# Patient Record
Sex: FEMALE | Race: WHITE
[De-identification: names, ages, dates, MRNs, and addresses within clinical notes are randomized per-mention and may not be internally consistent; named-entity substitution may affect disease eponyms.]

---

## 2019-02-15 ENCOUNTER — HOSPITAL ENCOUNTER (OUTPATIENT)
Dept: HOSPITAL 47 - RADMAMWWP | Age: 69
End: 2019-02-15
Payer: COMMERCIAL

## 2019-02-15 DIAGNOSIS — Z12.31: Primary | ICD-10-CM

## 2019-02-15 PROCEDURE — 77067 SCR MAMMO BI INCL CAD: CPT

## 2019-02-15 PROCEDURE — 77063 BREAST TOMOSYNTHESIS BI: CPT

## 2019-02-19 NOTE — MM
Reason for exam: screening  (asymptomatic).

Last mammogram was performed 1 year and 3 months ago.



History:

Patient is postmenopausal.

Family history of breast cancer in aunt at age 70.

Took estrogen beginning at age 43.



Physical Findings:

A clinical breast exam by your physician is recommended on an annual basis and 

results should be correlated with mammographic findings.



MG 3D Screening Mammo W/Cad

Bilateral CC and MLO view(s) were taken.

Prior study comparison: November 28, 2017, bilateral MG 3d screening mammo w/cad. 

September 13, 2016, bilateral MG 3d screening mammo w/cad.

The breast tissue is heterogeneously dense. This may lower the sensitivity of 

mammography.  Focal asymmetry right lower inner quadrant, stable.  No significant 

changes when compared with prior studies.





ASSESSMENT: Benign, BI-RAD 2



RECOMMENDATION:

Routine screening mammogram of both breasts in 1 year.

## 2019-09-27 ENCOUNTER — HOSPITAL ENCOUNTER (OUTPATIENT)
Dept: HOSPITAL 47 - RADUSWWP | Age: 69
Discharge: HOME | End: 2019-09-27
Attending: FAMILY MEDICINE
Payer: COMMERCIAL

## 2019-09-27 DIAGNOSIS — E04.1: Primary | ICD-10-CM

## 2019-09-27 PROCEDURE — 76536 US EXAM OF HEAD AND NECK: CPT

## 2019-09-27 NOTE — US
EXAMINATION TYPE: US thyroid st tissue head/neck

 

DATE OF EXAM: 9/27/2019

 

COMPARISON: NONE

 

CLINICAL HISTORY: R22.1 localized swelling and lump.

 

GLAND SIZE:

 

Right Lobe: 4.1 x 1.9 x 1.0 cm

** Overall Parenchyma:  homogenous

Left Lobe: 3.3 x 1.1 x 1.2 cm

** Overall Parenchyma:  homogeneous

Isthmus Thickness:  0.2 cm

 

NODULES

 

RIGHT:   # of nodules measured on right: 1

1.  0.8 X 0.7  x 0.7 cm isoechoic mixed nodule at the mid pole with well-defined margins .  This nodu
le is taller than wide and shows intranodular vascularity.

** No Prior 

 

LEFT:    # of nodules measured on left:  0 

 

ISTHMUS:    # of nodules measured in the isthmus:  0

 

Bilateral neck scanned, no evidence of lymphadenopathy.

 

Patient feels lump at area of thyroid, unable to appreciate any mass at this area.

 

 

 

IMPRESSION:

 

1. Subcentimeter nodule right lobe thyroid

## 2019-12-16 ENCOUNTER — HOSPITAL ENCOUNTER (EMERGENCY)
Dept: HOSPITAL 47 - EC | Age: 69
Discharge: HOME | End: 2019-12-16
Payer: COMMERCIAL

## 2019-12-16 VITALS
TEMPERATURE: 97.8 F | SYSTOLIC BLOOD PRESSURE: 164 MMHG | DIASTOLIC BLOOD PRESSURE: 77 MMHG | HEART RATE: 71 BPM | RESPIRATION RATE: 18 BRPM

## 2019-12-16 DIAGNOSIS — I10: ICD-10-CM

## 2019-12-16 DIAGNOSIS — Z95.818: ICD-10-CM

## 2019-12-16 DIAGNOSIS — S16.1XXA: Primary | ICD-10-CM

## 2019-12-16 DIAGNOSIS — E78.5: ICD-10-CM

## 2019-12-16 DIAGNOSIS — Z79.82: ICD-10-CM

## 2019-12-16 DIAGNOSIS — Z87.891: ICD-10-CM

## 2019-12-16 DIAGNOSIS — Z79.899: ICD-10-CM

## 2019-12-16 DIAGNOSIS — K21.9: ICD-10-CM

## 2019-12-16 DIAGNOSIS — I25.2: ICD-10-CM

## 2019-12-16 PROCEDURE — 96372 THER/PROPH/DIAG INJ SC/IM: CPT

## 2019-12-16 PROCEDURE — 99283 EMERGENCY DEPT VISIT LOW MDM: CPT

## 2019-12-16 NOTE — ED
Neck Injury/Pain HPI





- General


Chief Complaint: Neck Pain/Injury


Stated Complaint: neck pain


Time Seen by Provider: 12/16/19 13:48


Mode of arrival: ambulatory


Limitations: no limitations





- History of Present Illness


Initial Comments: 





patient is a 69-year-old female presenting to emergency Department with 

complaints of left-sided neck pain that started this morning when she woke up.  

Patient denies any injuries or trauma to her neck.  Patient states yesterday she

was feeling fine.  Patient states she woke up from bed and had stiffness on the 

left side of her neck.  Patient denies fever, chills, recent cough, blurriness, 

headache.  Patient states she had a tramadol left over and that did help with 

her symptoms.  Patient has no other complaints at this time.  Upon arrival to 

the ER, vital signs are stable.





- Related Data


                                Home Medications











 Medication  Instructions  Recorded  Confirmed


 


Aspirin 325 mg PO HS 07/01/15 03/04/16


 


Biotin 5 mg PO DAILY 07/01/15 03/04/16


 


Calcium Citrate 250 mg PO DAILY 07/01/15 03/04/16


 


Cholecalciferol [Vitamin D3] 2,000 unit PO DAILY 07/01/15 03/04/16


 


Esomeprazole Magnesium [NexIUM] 20 mg PO BID PRN 07/01/15 03/04/16


 


Fish Oil/Dha/Epa [Fish Oil 1,200 1 each PO BID 07/01/15 03/04/16





mg Fish Oil]   


 


Hydrochlorothiazide [Hydrodiuril] 25 mg PO DAILY 07/01/15 03/04/16


 


Ibuprofen [Motrin] 600 mg PO HS PRN 07/01/15 03/04/16


 


Isosorbide Mononitrate ER [Imdur] 30 mg PO QAM 07/01/15 03/04/16


 


Metoprolol Tartrate [Lopressor] 50 mg PO QAM 07/01/15 03/04/16


 


Multivitamins, Thera [Theragran] 1 each PO DAILY 07/01/15 03/04/16


 


Simvastatin [Zocor] 40 mg PO HS 07/01/15 03/04/16


 


Taztia Xt 120 mg PO HS 07/01/15 03/04/16








                                  Previous Rx's











 Medication  Instructions  Recorded


 


Docusate [Colace] 100 mg PO DAILY #30 capsule 03/05/16


 


HYDROcodone/APAP 5-325MG [Norco 5] 1 each PO Q4HR PRN #20 tab 03/05/16


 


Cyclobenzaprine [Flexeril] 5 mg PO BID PRN #10 tablet 12/16/19


 


predniSONE 20 mg PO BID 5 Days #10 tab 12/16/19











                                    Allergies











Allergy/AdvReac Type Severity Reaction Status Date / Time


 


No Known Allergies Allergy   Verified 12/16/19 13:37














Review of Systems


ROS Statement: 


Those systems with pertinent positive or pertinent negative responses have been 

documented in the HPI.





ROS Other: All systems not noted in ROS Statement are negative.





Past Medical History


Past Medical History: GERD/Reflux, Hyperlipidemia, Hypertension, Myocardial 

Infarction (MI)


Additional Past Medical History / Comment(s): DIVERTICULOSIS,CONSTIPATION, POS 

OCCULT STOOL


Last Myocardial Infarction Date:: 2006


History of Any Multi-Drug Resistant Organisms: None Reported


Past Surgical History: Heart Catheterization, Hysterectomy, Tonsillectomy


Past Anesthesia/Blood Transfusion Reactions: Motion Sickness


Additional Past Anesthesia/Blood Transfusion Reaction / Comment(s): STATED "HAS 

WOKEN UP DURING COLONOSCOPY IN THE PAST"


Past Psychological History: No Psychological Hx Reported


Smoking Status: Former smoker


Past Alcohol Use History: Occasional


Past Drug Use History: None Reported





- Past Family History


  ** Mother


Family Medical History: Cancer, Diabetes Mellitus


Additional Family Medical History / Comment(s): COLON





  ** Father


Family Medical History: Myocardial Infarction (MI)





General Exam





- General Exam Comments


Initial Comments: 





GENERAL: 


Well-appearing, well-nourished and in no acute distress.





HEAD: 


Atraumatic, normocephalic.





EYES:


Pupils equal round and reactive to light, extraocular movements intact, sclera 

anicteric, conjunctiva are normal.





ENT: 


TMs normal, nares patent, oropharynx clear without exudates.  Moist mucous 

membranes.





NECK: 


mild pain with palpation of the left cervical paraspinals as well as left upper 

trapezius.  She has decreased range of motion secondary to pain and spasm.  

Supple without lymphadenopathy or JVD.





LUNGS:


 Breath sounds clear to auscultation bilaterally and equal.  No wheezes rales or

 rhonchi.





HEART:


Regular rate and rhythm without murmurs, rubs or gallops.





EXTREMITIES: 


Normal range of motion, no pitting or edema.  No clubbing or cyanosis.





NEUROLOGICAL: 


Cranial nerves II through XII grossly intact.  Normal speech, normal gait.





PSYCH:


Normal mood, normal affect.





SKIN:


 Warm, Dry, normal turgor, no rashes or lesions noted.


Limitations: no limitations





Course


                                   Vital Signs











  12/16/19





  13:37


 


Temperature 97.8 F


 


Pulse Rate 71


 


Respiratory 18





Rate 


 


Blood Pressure 164/77


 


O2 Sat by Pulse 96





Oximetry 














Medical Decision Making





- Medical Decision Making





patient is a 69-year-old female presenting with left-sided neck spasm that 

happened suddenly this morning when she woke up.  Vital signs are stable.  No 

red flag symptoms.  I discussed with patient this is likely a muscle spasm.  

Patient will be given Toradol in the ER and will be sent home a short course of 

steroids and Flexeril.  Patient is agreeable with this plan of care.  Patient 

will follow up with her PCP as symptoms do not improve.  Stable for discharge at

 this time.





Disposition


Clinical Impression: 


 Strain of neck muscle





Disposition: HOME SELF-CARE


Condition: Stable


Instructions (If sedation given, give patient instructions):  Cervical Strain 

(ED)


Additional Instructions: 


Please return to the Emergency Department if symptoms worsen or any other co

ncerns.


use heat to the area as well as gentle stretching.


Trial of muscle relaxer and steroids.


Use Tylenol for pain relief.


Prescriptions: 


Cyclobenzaprine [Flexeril] 5 mg PO BID PRN #10 tablet


 PRN Reason: Muscle Spasm


predniSONE 20 mg PO BID 5 Days #10 tab


Is patient prescribed a controlled substance at d/c from ED?: No


Referrals: 


AALIYAH Lombardo III, MD [Primary Care Provider] - 1-2 days